# Patient Record
Sex: FEMALE
[De-identification: names, ages, dates, MRNs, and addresses within clinical notes are randomized per-mention and may not be internally consistent; named-entity substitution may affect disease eponyms.]

---

## 2022-07-08 ENCOUNTER — APPOINTMENT (OUTPATIENT)
Dept: ULTRASOUND IMAGING | Facility: CLINIC | Age: 32
End: 2022-07-08

## 2022-07-08 PROCEDURE — 76641 ULTRASOUND BREAST COMPLETE: CPT | Mod: LT

## 2023-04-17 DIAGNOSIS — Z00.00 ENCOUNTER FOR HEALTH MAINTENANCE EXAMINATION IN ADULT: Primary | ICD-10-CM

## 2023-04-25 ENCOUNTER — OFFICE VISIT (OUTPATIENT)
Dept: PRIMARY CARE | Facility: CLINIC | Age: 33
End: 2023-04-25
Payer: COMMERCIAL

## 2023-04-25 VITALS
SYSTOLIC BLOOD PRESSURE: 112 MMHG | TEMPERATURE: 98 F | HEIGHT: 61 IN | BODY MASS INDEX: 20.77 KG/M2 | OXYGEN SATURATION: 98 % | WEIGHT: 110.01 LBS | HEART RATE: 103 BPM | DIASTOLIC BLOOD PRESSURE: 70 MMHG

## 2023-04-25 DIAGNOSIS — Z00.00 PHYSICAL EXAM: Primary | ICD-10-CM

## 2023-04-25 DIAGNOSIS — Z3A.09 9 WEEKS GESTATION OF PREGNANCY (HHS-HCC): ICD-10-CM

## 2023-04-25 PROCEDURE — 1036F TOBACCO NON-USER: CPT | Performed by: INTERNAL MEDICINE

## 2023-04-25 PROCEDURE — 99395 PREV VISIT EST AGE 18-39: CPT | Performed by: INTERNAL MEDICINE

## 2023-04-25 NOTE — PROGRESS NOTES
"SUBJECTIVE:   Smiley Prather is a 33 y.o. female presenting for her annual checkup.  She is pregnant 9 weeks now, first one.  She still lives in [place], and here to visit parent  No morning sickness, Abdominal pain pelvic pain, spotting, leg edema  We could see record from her gyn at [facility].  Had labs done for pregnancy screening, all good, and not immune to mumps and parvovirus b19.  Will need to get mumps updated after pregnancy.    Colon screening: NA  Last pap: sees gyn  Last mammogram: NA  Diet:   healthy in general  Exercises:  likes swimming.  regularly or physically active.  Lab Results   Component Value Date    HGBA1C 5.3 04/14/2021     Lab Results   Component Value Date    CREATININE 0.52 04/14/2021     Lab Results   Component Value Date    WBC 5.0 04/14/2021    HGB 14.1 04/14/2021    HCT 43.3 04/14/2021    MCV 93 04/14/2021     04/14/2021     Lab Results   Component Value Date    CHOL 174 04/14/2021     Lab Results   Component Value Date    HDL 51.7 04/14/2021     No results found for: LDLCALC  Lab Results   Component Value Date    TRIG 111 04/14/2021     No components found for: CHOLHDL       ROS:  Feeling well. No dyspnea or chest pain on exertion. No abdominal pain, change in bowel habits, black or bloody stools. No urinary tract or  symptoms.  No breast concerns. No neurological complaints.    OBJECTIVE:   The patient appears well, alert, oriented x 3, in no distress.   /70   Pulse 103   Temp 36.7 °C (98 °F)   Ht 1.543 m (5' 0.75\")   Wt 49.9 kg (110 lb 0.2 oz)   SpO2 98%   BMI 20.96 kg/m²   ENT normal.  Neck supple. No adenopathy or thyromegaly. CINDY. Lungs are clear, good air entry, no wheezes, rhonchi or rales. S1 and S2 normal, no murmurs, regular rate and rhythm. Abdomen is soft without tenderness, guarding, mass or organomegaly.  Breast exam benign, and  exam deferred to Gyn.Extremities show no edema, normal peripheral pulses. Neurological is normal without focal findings.     1. " Physical exam       2. 9 weeks gestation of pregnancy  Under care of gyn at [facility]  She is taking prenatal vit. Doing well      PLAN:   continue current healthy lifestyle patterns and return for routine annual checkups    Follow up:  yearly